# Patient Record
Sex: FEMALE | Race: WHITE | NOT HISPANIC OR LATINO | Employment: OTHER | URBAN - METROPOLITAN AREA
[De-identification: names, ages, dates, MRNs, and addresses within clinical notes are randomized per-mention and may not be internally consistent; named-entity substitution may affect disease eponyms.]

---

## 2020-03-13 NOTE — PATIENT DISCUSSION
Patient to call and let us know if she [id number] like the Acuvue daily lenses and we can order a coopervision daily lens.

## 2020-09-16 ENCOUNTER — NEW PATIENT COMPREHENSIVE (OUTPATIENT)
Dept: URBAN - METROPOLITAN AREA CLINIC 35 | Facility: CLINIC | Age: 80
End: 2020-09-16

## 2020-09-16 DIAGNOSIS — H52.7: ICD-10-CM

## 2020-09-16 DIAGNOSIS — H40.023: ICD-10-CM

## 2020-09-16 DIAGNOSIS — H43.812: ICD-10-CM

## 2020-09-16 DIAGNOSIS — H35.3132: ICD-10-CM

## 2020-09-16 PROCEDURE — 92004 COMPRE OPH EXAM NEW PT 1/>: CPT

## 2020-09-16 PROCEDURE — 92134 CPTRZ OPH DX IMG PST SGM RTA: CPT

## 2020-09-16 PROCEDURE — 92015 DETERMINE REFRACTIVE STATE: CPT

## 2020-09-16 PROCEDURE — 92133 CPTRZD OPH DX IMG PST SGM ON: CPT

## 2020-09-16 ASSESSMENT — VISUAL ACUITY
OU_SC: 20/20-1
OS_SC: 20/60-1
OD_SC: 20/25-1
OS_CC: J5
OU_CC: J1+
OD_CC: J1

## 2020-09-16 ASSESSMENT — TONOMETRY
OD_IOP_MMHG: 14
OS_IOP_MMHG: 15

## 2020-09-16 ASSESSMENT — KERATOMETRY
OS_K1POWER_DIOPTERS: 46.25
OD_K2POWER_DIOPTERS: 43.75
OD_K1POWER_DIOPTERS: 45.25
OS_K2POWER_DIOPTERS: 43.75

## 2020-10-16 ENCOUNTER — TECH ONLY (OUTPATIENT)
Dept: URBAN - METROPOLITAN AREA CLINIC 35 | Facility: CLINIC | Age: 80
End: 2020-10-16

## 2020-10-16 DIAGNOSIS — H35.3132: ICD-10-CM

## 2020-10-16 DIAGNOSIS — H43.812: ICD-10-CM

## 2020-10-16 DIAGNOSIS — H40.023: ICD-10-CM

## 2020-10-16 PROCEDURE — 99211T TECH SERVICE

## 2020-10-16 PROCEDURE — 92083 EXTENDED VISUAL FIELD XM: CPT

## 2020-10-16 ASSESSMENT — KERATOMETRY
OD_K2POWER_DIOPTERS: 43.75
OD_K1POWER_DIOPTERS: 45.25
OS_K2POWER_DIOPTERS: 43.75
OS_K1POWER_DIOPTERS: 46.25

## 2021-03-15 ASSESSMENT — KERATOMETRY
OS_K1POWER_DIOPTERS: 46.25
OS_K2POWER_DIOPTERS: 43.75
OD_K2POWER_DIOPTERS: 43.75
OD_K1POWER_DIOPTERS: 45.25

## 2021-03-16 ENCOUNTER — FOLLOW UP (OUTPATIENT)
Dept: URBAN - METROPOLITAN AREA CLINIC 35 | Facility: CLINIC | Age: 81
End: 2021-03-16

## 2021-03-16 DIAGNOSIS — H43.812: ICD-10-CM

## 2021-03-16 DIAGNOSIS — H40.023: ICD-10-CM

## 2021-03-16 DIAGNOSIS — H35.3132: ICD-10-CM

## 2021-03-16 PROCEDURE — 92134 CPTRZ OPH DX IMG PST SGM RTA: CPT

## 2021-03-16 PROCEDURE — 92012 INTRM OPH EXAM EST PATIENT: CPT

## 2021-03-16 ASSESSMENT — VISUAL ACUITY
OS_SC: 20/50-1
OD_SC: 20/20-2

## 2021-03-16 ASSESSMENT — TONOMETRY
OD_IOP_MMHG: 14
OS_IOP_MMHG: 15

## 2021-09-15 ASSESSMENT — KERATOMETRY
OS_K2POWER_DIOPTERS: 43.75
OD_K1POWER_DIOPTERS: 45.25
OD_K2POWER_DIOPTERS: 43.75
OS_K1POWER_DIOPTERS: 46.25

## 2021-09-16 ENCOUNTER — ESTABLISHED COMPREHENSIVE EXAM (OUTPATIENT)
Dept: URBAN - METROPOLITAN AREA CLINIC 35 | Facility: CLINIC | Age: 81
End: 2021-09-16

## 2021-09-16 DIAGNOSIS — H43.812: ICD-10-CM

## 2021-09-16 DIAGNOSIS — H40.023: ICD-10-CM

## 2021-09-16 DIAGNOSIS — H52.7: ICD-10-CM

## 2021-09-16 DIAGNOSIS — H35.3132: ICD-10-CM

## 2021-09-16 PROCEDURE — 92015 DETERMINE REFRACTIVE STATE: CPT

## 2021-09-16 PROCEDURE — 92014 COMPRE OPH EXAM EST PT 1/>: CPT

## 2021-09-16 PROCEDURE — 92133 CPTRZD OPH DX IMG PST SGM ON: CPT

## 2021-09-16 PROCEDURE — 92134 CPTRZ OPH DX IMG PST SGM RTA: CPT

## 2021-09-16 ASSESSMENT — TONOMETRY
OS_IOP_MMHG: 15
OD_IOP_MMHG: 14

## 2021-09-16 ASSESSMENT — VISUAL ACUITY
OD_SC: 20/30-2
OD_CC: J1
OS_SC: 20/60
OD_PH: 20/25

## 2022-03-29 ENCOUNTER — FOLLOW UP (OUTPATIENT)
Dept: URBAN - METROPOLITAN AREA CLINIC 35 | Facility: CLINIC | Age: 82
End: 2022-03-29

## 2022-03-29 DIAGNOSIS — H35.3132: ICD-10-CM

## 2022-03-29 DIAGNOSIS — H43.812: ICD-10-CM

## 2022-03-29 DIAGNOSIS — H40.023: ICD-10-CM

## 2022-03-29 PROCEDURE — 92134 CPTRZ OPH DX IMG PST SGM RTA: CPT

## 2022-03-29 PROCEDURE — 92012 INTRM OPH EXAM EST PATIENT: CPT

## 2022-03-29 ASSESSMENT — VISUAL ACUITY
OD_CC: J3
OS_SC: 20/70
OD_SC: 20/20-1
OS_PH: 20/50

## 2022-03-29 ASSESSMENT — KERATOMETRY
OS_K1POWER_DIOPTERS: 46.25
OD_K1POWER_DIOPTERS: 45.25
OD_K2POWER_DIOPTERS: 43.75
OS_K2POWER_DIOPTERS: 43.75

## 2022-09-19 ENCOUNTER — COMPREHENSIVE EXAM (OUTPATIENT)
Dept: URBAN - METROPOLITAN AREA CLINIC 35 | Facility: CLINIC | Age: 82
End: 2022-09-19

## 2022-09-19 DIAGNOSIS — H43.812: ICD-10-CM

## 2022-09-19 DIAGNOSIS — H35.3132: ICD-10-CM

## 2022-09-19 DIAGNOSIS — H40.023: ICD-10-CM

## 2022-09-19 DIAGNOSIS — H52.7: ICD-10-CM

## 2022-09-19 PROCEDURE — 92015 DETERMINE REFRACTIVE STATE: CPT

## 2022-09-19 PROCEDURE — 92250 FUNDUS PHOTOGRAPHY W/I&R: CPT

## 2022-09-19 PROCEDURE — 92133 CPTRZD OPH DX IMG PST SGM ON: CPT

## 2022-09-19 PROCEDURE — 92014 COMPRE OPH EXAM EST PT 1/>: CPT

## 2022-09-19 ASSESSMENT — KERATOMETRY
OD_AXISANGLE2_DEGREES: 35
OS_AXISANGLE_DEGREES: 65
OS_K1POWER_DIOPTERS: 44.25
OS_AXISANGLE2_DEGREES: 155
OD_AXISANGLE_DEGREES: 125
OS_K2POWER_DIOPTERS: 45.50
OD_K1POWER_DIOPTERS: 44.25
OD_K2POWER_DIOPTERS: 45.25

## 2022-09-19 ASSESSMENT — VISUAL ACUITY
OD_SC: 20/20-1
OS_CC: J4
OS_SC: 20/70
OS_PH: 20/50-2
OU_CC: J1
OD_CC: J1
OU_SC: 20/20-1

## 2022-09-19 ASSESSMENT — TONOMETRY
OD_IOP_MMHG: 16
OS_IOP_MMHG: 16

## 2023-05-23 ENCOUNTER — EMERGENCY VISIT (OUTPATIENT)
Dept: URBAN - METROPOLITAN AREA CLINIC 35 | Facility: CLINIC | Age: 83
End: 2023-05-23

## 2023-05-23 DIAGNOSIS — S05.01XA: ICD-10-CM

## 2023-05-23 DIAGNOSIS — H57.11: ICD-10-CM

## 2023-05-23 PROCEDURE — 92012 INTRM OPH EXAM EST PATIENT: CPT

## 2023-05-23 RX ORDER — MOXIFLOXACIN OPHTHALMIC 5 MG/ML
1 SOLUTION/ DROPS OPHTHALMIC
Start: 2023-05-23 | End: 2023-05-29

## 2023-05-23 ASSESSMENT — TONOMETRY
OS_IOP_MMHG: 14
OD_IOP_MMHG: 13

## 2023-05-23 ASSESSMENT — KERATOMETRY
OD_AXISANGLE2_DEGREES: 35
OS_K1POWER_DIOPTERS: 44.25
OS_K2POWER_DIOPTERS: 45.50
OD_K1POWER_DIOPTERS: 44.25
OS_AXISANGLE2_DEGREES: 155
OS_AXISANGLE_DEGREES: 65
OD_AXISANGLE_DEGREES: 125
OD_K2POWER_DIOPTERS: 45.25

## 2023-05-23 ASSESSMENT — VISUAL ACUITY
OD_SC: 20/25+2
OS_SC: 20/50-2

## 2023-09-20 ENCOUNTER — COMPREHENSIVE EXAM (OUTPATIENT)
Dept: URBAN - METROPOLITAN AREA CLINIC 35 | Facility: CLINIC | Age: 83
End: 2023-09-20

## 2023-09-20 DIAGNOSIS — H35.3132: ICD-10-CM

## 2023-09-20 DIAGNOSIS — H52.7: ICD-10-CM

## 2023-09-20 DIAGNOSIS — H43.812: ICD-10-CM

## 2023-09-20 DIAGNOSIS — H40.023: ICD-10-CM

## 2023-09-20 PROCEDURE — 92014 COMPRE OPH EXAM EST PT 1/>: CPT

## 2023-09-20 PROCEDURE — 92015 DETERMINE REFRACTIVE STATE: CPT

## 2023-09-20 PROCEDURE — 92250 FUNDUS PHOTOGRAPHY W/I&R: CPT

## 2023-09-20 ASSESSMENT — TONOMETRY
OD_IOP_MMHG: 15
OS_IOP_MMHG: 15

## 2023-09-20 ASSESSMENT — VISUAL ACUITY
OS_CC: J2
OU_CC: J1
OS_SC: 20/70+2
OU_SC: 20/25+2
OD_SC: 20/25+2
OD_CC: J1

## 2024-04-08 ENCOUNTER — FOLLOW UP (OUTPATIENT)
Dept: URBAN - METROPOLITAN AREA CLINIC 35 | Facility: CLINIC | Age: 84
End: 2024-04-08

## 2024-04-08 DIAGNOSIS — H43.812: ICD-10-CM

## 2024-04-08 DIAGNOSIS — H40.023: ICD-10-CM

## 2024-04-08 DIAGNOSIS — H35.3132: ICD-10-CM

## 2024-04-08 PROCEDURE — 92134 CPTRZ OPH DX IMG PST SGM RTA: CPT

## 2024-04-08 PROCEDURE — 92014 COMPRE OPH EXAM EST PT 1/>: CPT

## 2024-04-08 ASSESSMENT — TONOMETRY
OD_IOP_MMHG: 11
OS_IOP_MMHG: 12

## 2024-04-08 ASSESSMENT — VISUAL ACUITY
OD_PH: 20/40-2
OU_SC: 20/40-2
OD_SC: 20/50-1
OS_SC: 20/60-2

## 2025-01-07 ENCOUNTER — COMPREHENSIVE EXAM (OUTPATIENT)
Age: 85
End: 2025-01-07

## 2025-01-07 DIAGNOSIS — H35.3211: ICD-10-CM

## 2025-01-07 DIAGNOSIS — H40.023: ICD-10-CM

## 2025-01-07 DIAGNOSIS — H35.3122: ICD-10-CM

## 2025-01-07 DIAGNOSIS — H43.812: ICD-10-CM

## 2025-01-07 PROCEDURE — 92133 CPTRZD OPH DX IMG PST SGM ON: CPT

## 2025-01-07 PROCEDURE — 92250 FUNDUS PHOTOGRAPHY W/I&R: CPT

## 2025-01-07 PROCEDURE — 99214 OFFICE O/P EST MOD 30 MIN: CPT

## 2025-01-30 ENCOUNTER — CONSULTATION/EVALUATION (OUTPATIENT)
Age: 85
End: 2025-01-30

## 2025-01-30 DIAGNOSIS — H40.023: ICD-10-CM

## 2025-01-30 DIAGNOSIS — H35.363: ICD-10-CM

## 2025-01-30 DIAGNOSIS — H43.813: ICD-10-CM

## 2025-01-30 DIAGNOSIS — H35.3231: ICD-10-CM

## 2025-01-30 DIAGNOSIS — H35.731: ICD-10-CM

## 2025-01-30 DIAGNOSIS — H04.123: ICD-10-CM

## 2025-01-30 PROCEDURE — 92134 CPTRZ OPH DX IMG PST SGM RTA: CPT

## 2025-01-30 PROCEDURE — 92273 FULL FIELD ERG W/I&R: CPT

## 2025-01-30 PROCEDURE — 92242 FLUORESCEIN&ICG ANGIOGRAPHY: CPT

## 2025-01-30 PROCEDURE — 99214 OFFICE O/P EST MOD 30 MIN: CPT | Mod: 25

## 2025-01-30 PROCEDURE — 67028 INJECTION EYE DRUG: CPT | Mod: 50,RT

## 2025-03-20 ENCOUNTER — CLINIC PROCEDURE ONLY (OUTPATIENT)
Age: 85
End: 2025-03-20

## 2025-03-20 DIAGNOSIS — H35.731: ICD-10-CM

## 2025-03-20 DIAGNOSIS — H35.3231: ICD-10-CM

## 2025-03-20 PROCEDURE — 92250 FUNDUS PHOTOGRAPHY W/I&R: CPT

## 2025-03-20 PROCEDURE — 67028 INJECTION EYE DRUG: CPT | Mod: 50,RT

## 2025-04-24 ENCOUNTER — COMPREHENSIVE EXAM (OUTPATIENT)
Age: 85
End: 2025-04-24

## 2025-04-24 DIAGNOSIS — H35.731: ICD-10-CM

## 2025-04-24 DIAGNOSIS — H04.123: ICD-10-CM

## 2025-04-24 DIAGNOSIS — H35.3124: ICD-10-CM

## 2025-04-24 DIAGNOSIS — H35.363: ICD-10-CM

## 2025-04-24 DIAGNOSIS — H35.3231: ICD-10-CM

## 2025-04-24 PROCEDURE — 67028 INJECTION EYE DRUG: CPT | Mod: 50,RT

## 2025-04-24 PROCEDURE — 92134 CPTRZ OPH DX IMG PST SGM RTA: CPT

## 2025-04-24 PROCEDURE — J2777PFS VABYSMO PFS: Mod: JZ,LT

## 2025-04-24 PROCEDURE — 99214 OFFICE O/P EST MOD 30 MIN: CPT | Mod: 25

## 2025-04-24 PROCEDURE — J2777PFS VABYSMO PFS: Mod: JZ,RT

## 2025-04-24 PROCEDURE — 92242 FLUORESCEIN&ICG ANGIOGRAPHY: CPT

## 2025-05-02 ENCOUNTER — COMPREHENSIVE EXAM (OUTPATIENT)
Age: 85
End: 2025-05-02

## 2025-05-02 DIAGNOSIS — H43.813: ICD-10-CM

## 2025-05-02 DIAGNOSIS — H35.363: ICD-10-CM

## 2025-05-02 DIAGNOSIS — H35.3124: ICD-10-CM

## 2025-05-02 DIAGNOSIS — H35.3231: ICD-10-CM

## 2025-05-02 DIAGNOSIS — H35.731: ICD-10-CM

## 2025-05-02 PROCEDURE — 99214 OFFICE O/P EST MOD 30 MIN: CPT | Mod: 25

## 2025-05-02 PROCEDURE — 67028 INJECTION EYE DRUG: CPT

## 2025-05-02 PROCEDURE — 92134 CPTRZ OPH DX IMG PST SGM RTA: CPT

## 2025-05-29 ENCOUNTER — CLINIC PROCEDURE ONLY (OUTPATIENT)
Age: 85
End: 2025-05-29

## 2025-05-29 DIAGNOSIS — H35.731: ICD-10-CM

## 2025-05-29 DIAGNOSIS — H35.3231: ICD-10-CM

## 2025-05-29 PROCEDURE — 92250 FUNDUS PHOTOGRAPHY W/I&R: CPT

## 2025-05-29 PROCEDURE — 92273 FULL FIELD ERG W/I&R: CPT

## 2025-05-29 PROCEDURE — J2777PFS VABYSMO PFS: Mod: JZ,LT

## 2025-05-29 PROCEDURE — J2777PFS VABYSMO PFS: Mod: JZ,RT

## 2025-05-29 PROCEDURE — 67028 INJECTION EYE DRUG: CPT

## 2025-06-05 ENCOUNTER — CLINIC PROCEDURE ONLY (OUTPATIENT)
Age: 85
End: 2025-06-05

## 2025-06-05 DIAGNOSIS — H35.3124: ICD-10-CM

## 2025-06-05 DIAGNOSIS — H35.731: ICD-10-CM

## 2025-06-05 PROCEDURE — 67028 INJECTION EYE DRUG: CPT

## 2025-06-05 PROCEDURE — 92242 FLUORESCEIN&ICG ANGIOGRAPHY: CPT

## 2025-07-10 ENCOUNTER — CLINIC PROCEDURE ONLY (OUTPATIENT)
Age: 85
End: 2025-07-10

## 2025-07-10 DIAGNOSIS — H35.731: ICD-10-CM

## 2025-07-10 DIAGNOSIS — H35.3231: ICD-10-CM

## 2025-07-10 PROCEDURE — J2777PFS VABYSMO PFS: Mod: JZ,RT

## 2025-07-10 PROCEDURE — 92250 FUNDUS PHOTOGRAPHY W/I&R: CPT

## 2025-07-10 PROCEDURE — J2777PFS VABYSMO PFS: Mod: JZ,LT

## 2025-07-10 PROCEDURE — 67028 INJECTION EYE DRUG: CPT | Mod: 50,RT

## 2025-07-15 ENCOUNTER — CLINIC PROCEDURE ONLY (OUTPATIENT)
Age: 85
End: 2025-07-15

## 2025-07-15 DIAGNOSIS — H35.731: ICD-10-CM

## 2025-07-15 DIAGNOSIS — H35.3124: ICD-10-CM

## 2025-07-15 PROCEDURE — 92250 FUNDUS PHOTOGRAPHY W/I&R: CPT

## 2025-07-15 PROCEDURE — 67028 INJECTION EYE DRUG: CPT

## 2025-08-28 ENCOUNTER — CLINIC PROCEDURE ONLY (OUTPATIENT)
Age: 85
End: 2025-08-28

## 2025-08-28 DIAGNOSIS — H35.3231: ICD-10-CM

## 2025-08-28 DIAGNOSIS — H35.731: ICD-10-CM

## 2025-08-28 PROCEDURE — 67028 INJECTION EYE DRUG: CPT | Mod: 50,RT

## 2025-08-28 PROCEDURE — J2777PFS VABYSMO PFS: Mod: JZ,RT

## 2025-08-28 PROCEDURE — J2777PFS VABYSMO PFS: Mod: JZ,LT

## 2025-08-28 PROCEDURE — 92250 FUNDUS PHOTOGRAPHY W/I&R: CPT
